# Patient Record
Sex: FEMALE | Race: WHITE | NOT HISPANIC OR LATINO | URBAN - METROPOLITAN AREA
[De-identification: names, ages, dates, MRNs, and addresses within clinical notes are randomized per-mention and may not be internally consistent; named-entity substitution may affect disease eponyms.]

---

## 2017-11-16 ENCOUNTER — OFFICE VISIT (OUTPATIENT)
Dept: URGENT CARE | Facility: CLINIC | Age: 57
End: 2017-11-16
Payer: COMMERCIAL

## 2017-11-16 PROCEDURE — G0382 LEV 3 HOSP TYPE B ED VISIT: HCPCS

## 2017-11-17 NOTE — PROGRESS NOTES
Assessment  1  Acute laryngitis (464 00) (J04 0)    Plan  Acute laryngitis    · Fluticasone Propionate 50 MCG/ACT Nasal Suspension; 1 spray in ea nostril bid   · Zithromax Z-Khari 250 MG Oral Tablet (Azithromycin); TAKE AS DIRECTED   · We suggest that you try a probiotic supplement ; Status:Complete;   Done: [id number]    Discussion/Summary  Discussion Summary:   Treating this as a laryngitis  Use medications as written  Follow-up with your family doctor if you fail  Chief Complaint    1  Cold Symptoms  Chief Complaint Free Text Note Form: Pt reports 2 weeks of sore through and NP cough  Reports sore throat has improved but she lost her voice 2 days ago  Denies fevers  History of Present Illness  HPI: Patient is a 52-year-old female who has had cough and congestion for the last 2 weeks  In the last 4 days she's had a laryngitis  She quit smoking 25 years ago  She does not feel sick  She had been taking vitamin D previously was stopped it  Hospital Based Practices Required Assessment:  Pain Assessment  the patient states they do not have pain  Abuse And Domestic Violence Screen   Yes, the patient is safe at home  -- The patient states no one is hurting them  Depression And Suicide Screen  No, the patient has not had thoughts of hurting themself  No, the patient has not felt depressed in the past 7 days  Prefered Language is  [place]  Primary Language is  English  Review of Systems  Focused-Female:  Constitutional: No fever, no chills, feels well, no tiredness, no recent weight gain or loss  ENT: as noted in HPI  Respiratory: as noted in HPI  Musculoskeletal: no complaints of arthralgia, no myalgia, no joint swelling or stiffness, no limb pain or swelling  Integumentary: no complaints of skin rash or lesion, no itching or dry skin, no skin wounds  Past Medical History  1  History of migraine (V12 49) (Z86 69)    Social History     · Former smoker (D46 50) (O16 573)    Current Meds   1  Imitrex 50 MG Oral Tablet; Therapy: (Recorded:[id number]) to Recorded   2  Metoprolol Tartrate 50 MG Oral Tablet; Therapy: (Recorded:[id number]) to Recorded    Allergies    1  No Known Drug Allergies    Vitals  Signs   Recorded: [id number] 08:21AM   Temperature: 97 8 F  Heart Rate: 36  Respiration: 16  Systolic: 334  Diastolic: 52  Height: 5 ft 2 in  Weight: 141 lb   BMI Calculated: 25 79  BSA Calculated: 1 65  O2 Saturation: 98    Physical Exam   Constitutional  General appearance: No acute distress, well appearing and well nourished  Eyes  Conjunctiva and lids: No swelling, erythema or discharge  Pupils and irises: Equal, round and reactive to light  Ears, Nose, Mouth, and Throat  External inspection of ears and nose: Normal    Oropharynx: Normal with no erythema, edema, exudate or lesions  Pulmonary  Respiratory effort: No increased work of breathing or signs of respiratory distress  Auscultation of lungs: Clear to auscultation  Cardiovascular  Auscultation of heart: Normal rate and rhythm, normal S1 and S2, without murmurs  Musculoskeletal  Gait and station: Normal    Digits and nails: Normal without clubbing or cyanosis  Inspection/palpation of joints, bones, and muscles: Normal    Skin  Skin and subcutaneous tissue: Normal without rashes or lesions  Additional Exam:  There is no tenderness to palpation over the facial sinuses  Message  Return to work or school:   Nate Marion is under my professional care   She was seen in my office on 11/16/17             Signatures   Electronically signed by : Ruchi Chapman MD; Nov 16 2017 12:01PM EST                       (Author)

## 2018-01-18 NOTE — MISCELLANEOUS
Message  Return to work or school:   Jacquie Rivas is under my professional care   She was seen in my office on 11/16/17             Signatures   Electronically signed by : Tiffanie Lobato MD; Nov 16 2017 12:01PM EST                       (Author)

## 2018-07-13 ENCOUNTER — TELEPHONE (OUTPATIENT)
Dept: PAIN MEDICINE | Facility: MEDICAL CENTER | Age: 58
End: 2018-07-13

## 2018-07-13 NOTE — TELEPHONE ENCOUNTER
Pt is scheduled w/Dr Jeong for 8/16/18  She will be picking up her new patient paperwork today in the [facility]

## 2018-07-13 NOTE — TELEPHONE ENCOUNTER
Pt called the phone room to cx her appt w/Dr Jeong on 7/16/18 @ 8:00  Pt told the phone room she was going to take the surgical route instead of coming to our office  7/16/18 appt has been cx  Received records and order from Dr Serjio Marc  Records/order is back on the shelf by the nurse in [facility]

## 2018-07-13 NOTE — TELEPHONE ENCOUNTER
Pt called stating that Dr Lay Becerril is referring her for a lumbar epidural steroid injection  Pt states her diagnosis is lumbago with r-sided sciatica  Pt states she had x-rays and an MRI at [facility] and will get the disc  After consulting with Dr Lay Becerril last night, patient was under the impression that she would be coming in to get the injection on her first visit  I explained that the first visit is just a consultation and the patient became annoyed  States that she has been dealing with this for a while now and has to keep making appointments and getting tests and imaging and now she has to schedule another consultation before getting the injection  Please advise where to schedule this patient  Per new guidelines, Dr Lay Becerril referrals must be scheduled asap  I do not see any openings in your schedule and I do not have the security to double book patients  Pt can be reached at [phone]